# Patient Record
Sex: MALE | Race: WHITE | HISPANIC OR LATINO | Employment: OTHER | URBAN - METROPOLITAN AREA
[De-identification: names, ages, dates, MRNs, and addresses within clinical notes are randomized per-mention and may not be internally consistent; named-entity substitution may affect disease eponyms.]

---

## 2021-01-27 ENCOUNTER — PREPPED CHART (OUTPATIENT)
Dept: URBAN - METROPOLITAN AREA CLINIC 28 | Facility: CLINIC | Age: 31
End: 2021-01-27

## 2022-03-23 ENCOUNTER — ESTABLISHED PATIENT (OUTPATIENT)
Dept: URBAN - METROPOLITAN AREA CLINIC 28 | Facility: CLINIC | Age: 32
End: 2022-03-23

## 2022-03-23 DIAGNOSIS — H50.00: ICD-10-CM

## 2022-03-23 DIAGNOSIS — H52.03: ICD-10-CM

## 2022-03-23 PROCEDURE — 92015 DETERMINE REFRACTIVE STATE: CPT

## 2022-03-23 PROCEDURE — 92014 COMPRE OPH EXAM EST PT 1/>: CPT

## 2022-03-23 PROCEDURE — 92310-1 LEVEL 1 CONTACT LENS MANAGEMENT

## 2022-03-23 ASSESSMENT — KERATOMETRY
OS_K2POWER_DIOPTERS: 49.25
OS_AXISANGLE2_DEGREES: 78
OD_AXISANGLE_DEGREES: 14
OD_K2POWER_DIOPTERS: 48.25
OS_AXISANGLE_DEGREES: 168
OD_K1POWER_DIOPTERS: 46.75
OD_AXISANGLE2_DEGREES: 104
OS_K1POWER_DIOPTERS: 47.5

## 2022-03-23 ASSESSMENT — TONOMETRY
OD_IOP_MMHG: 10
OS_IOP_MMHG: 11

## 2022-03-23 ASSESSMENT — VISUAL ACUITY
OS_SC: J2
OD_SC: J2
OD_SC: 20/30-2
OS_SC: 20/40+1
OU_SC: 20/30-2

## 2022-03-23 NOTE — PATIENT DISCUSSION
Patient educated on condition. Pt understands esotropia sc correction. c glasses/cls essential ortho - minimal esophoria.

## 2023-04-05 ENCOUNTER — ESTABLISHED PATIENT (OUTPATIENT)
Dept: URBAN - METROPOLITAN AREA CLINIC 28 | Facility: CLINIC | Age: 33
End: 2023-04-05

## 2023-04-05 DIAGNOSIS — H52.03: ICD-10-CM

## 2023-04-05 PROCEDURE — 92015 DETERMINE REFRACTIVE STATE: CPT

## 2023-04-05 PROCEDURE — 92310-2 LEVEL 2 CONTACT LENS MANAGEMENT

## 2023-04-05 PROCEDURE — 92012 INTRM OPH EXAM EST PATIENT: CPT

## 2023-04-05 ASSESSMENT — KERATOMETRY
OS_K1POWER_DIOPTERS: 47.25
OS_AXISANGLE_DEGREES: 154
OD_AXISANGLE_DEGREES: 23
OS_AXISANGLE2_DEGREES: 64
OD_K2POWER_DIOPTERS: 48.25
OD_AXISANGLE2_DEGREES: 113
OD_K1POWER_DIOPTERS: 46.75
OS_K2POWER_DIOPTERS: 48.75

## 2023-04-05 ASSESSMENT — TONOMETRY
OD_IOP_MMHG: 9
OS_IOP_MMHG: 9

## 2023-04-05 ASSESSMENT — VISUAL ACUITY
OS_CC: 20/30-2 BLURRY
OD_CC: 20/30-2

## 2023-04-12 ENCOUNTER — FOLLOW UP (OUTPATIENT)
Dept: URBAN - METROPOLITAN AREA CLINIC 28 | Facility: CLINIC | Age: 33
End: 2023-04-12

## 2023-04-12 DIAGNOSIS — H50.00: ICD-10-CM

## 2023-04-12 DIAGNOSIS — H52.03: ICD-10-CM

## 2023-04-12 DIAGNOSIS — Z46.0: ICD-10-CM

## 2023-04-12 PROCEDURE — 92310F

## 2023-04-12 ASSESSMENT — VISUAL ACUITY
OS_CC: J2
OD_CC: 20/30+1
OS_CC: 20/30+2
OD_CC: J2

## 2023-04-12 ASSESSMENT — KERATOMETRY
OS_AXISANGLE2_DEGREES: 64
OD_K1POWER_DIOPTERS: 46.75
OD_AXISANGLE_DEGREES: 23
OS_K1POWER_DIOPTERS: 47.25
OD_AXISANGLE2_DEGREES: 113
OD_K2POWER_DIOPTERS: 48.25
OS_AXISANGLE_DEGREES: 154
OS_K2POWER_DIOPTERS: 48.75

## 2025-04-24 ENCOUNTER — COMPREHENSIVE EXAM (OUTPATIENT)
Age: 35
End: 2025-04-24

## 2025-04-24 DIAGNOSIS — H52.03: ICD-10-CM

## 2025-04-24 DIAGNOSIS — H50.00: ICD-10-CM

## 2025-04-24 PROCEDURE — 92015 DETERMINE REFRACTIVE STATE: CPT

## 2025-04-24 PROCEDURE — 92310-3 LEVEL 3 SOFT LENS UPDATE: Mod: 21

## 2025-04-24 PROCEDURE — 92014 COMPRE OPH EXAM EST PT 1/>: CPT
